# Patient Record
Sex: FEMALE | Race: WHITE
[De-identification: names, ages, dates, MRNs, and addresses within clinical notes are randomized per-mention and may not be internally consistent; named-entity substitution may affect disease eponyms.]

---

## 2019-01-23 LAB
BASOPHILS NFR BLD AUTO: 0.8 % (ref 0–2)
EOSINOPHIL NFR BLD: 3.3 % (ref 0–7)
ERYTHROCYTE [DISTWIDTH] IN BLOOD BY AUTOMATED COUNT: 18.7 % (ref 11.5–14.5)
HCT VFR BLD CALC: 28.3 % (ref 36–48)
HGB BLD-MCNC: 7.8 G/DL (ref 12–16)
IMM GRANULOCYTES NFR BLD: 0.2 % (ref 0–5)
LYMPHOCYTES NFR BLD AUTO: 31.7 % (ref 15–50)
MCH RBC QN AUTO: 15.5 PG (ref 26–34)
MCHC RBC AUTO-ENTMCNC: 27.6 G/DL (ref 31–37)
MCV RBC: 56.2 FL (ref 80–100)
MONOCYTES NFR BLD: 7.2 % (ref 2–11)
NEUTROPHILS NFR BLD AUTO: 56.8 % (ref 40–80)
PLATELET # BLD: 424 10X3/UL (ref 130–400)
RBC # BLD AUTO: 5.04 10X6/UL (ref 4–5.4)
WBC # BLD AUTO: 11.9 10X3/UL (ref 4.8–10.8)

## 2019-01-25 ENCOUNTER — HOSPITAL ENCOUNTER (OUTPATIENT)
Dept: HOSPITAL 84 - D.OPS | Age: 29
Discharge: HOME | End: 2019-01-25
Attending: OBSTETRICS & GYNECOLOGY
Payer: COMMERCIAL

## 2019-01-25 VITALS
HEIGHT: 64 IN | BODY MASS INDEX: 18.78 KG/M2 | HEIGHT: 64 IN | BODY MASS INDEX: 18.78 KG/M2 | BODY MASS INDEX: 18.78 KG/M2 | WEIGHT: 110 LBS | WEIGHT: 110 LBS

## 2019-01-25 VITALS — DIASTOLIC BLOOD PRESSURE: 61 MMHG | SYSTOLIC BLOOD PRESSURE: 115 MMHG

## 2019-01-25 DIAGNOSIS — N88.2: Primary | ICD-10-CM

## 2019-01-25 LAB — HCG UR QL: NEGATIVE

## 2019-01-25 NOTE — OP
PATIENT NAME:  LEANDRO GOODE                             MEDICAL RECORD: [id number]
:89                                             LOCATION:D.OPS          
                                                         ADMISSION DATE:        
SURGEON:  MAXIMILIAN BAXTER MD        
 
 
DATE OF OPERATION:  2019
 
PREOPERATIVE DIAGNOSES:
1.  Secondary amenorrhea.
2.  Suspected cervical stenosis.
 
PREOPERATIVE DIAGNOSES:
1.  Secondary amenorrhea.
2.  Suspected cervical stenosis.
 
PROCEDURE:  Hysteroscopy and D&C.
 
SURGEON:  Maximilian Baxter MD
 
ANESTHESIA:  General endotracheal.
 
INTRAVENOUS FLUIDS:  Per anesthesia record.
 
HYSTEROSCOPIC FLUID LOSS:  Approximately 100 cc of 0.9 normal saline.
 
COMPLICATIONS:  None apparent.
 
SPECIMENS:  Endometrial curettings.
 
FINDINGS:
1.  Grossly normal-appearing external genitalia, vagina, and cervix.
2.  No evidence of cervical stenosis.
3.  Grossly normal-appearing proliferative phase endometrial canal.
 
COMPLICATIONS:  None apparent.
 
DESCRIPTION OF PROCEDURE:  The patient was taken to the operating room, where
general anesthesia was achieved without any difficulty.  The patient was then
prepped and draped in normal sterile fashion in the dorsal lithotomy position in
the [facility].  After prep and drape, the bladder was drained of
approximately 100 cc of clear yellow urine.  A Graves speculum was then placed
in the vagina.  The cervix was then identified and grasped on the anterior lip
with a single-tooth tenaculum.  At this point, the patient was dilated to
approximately 5-6 mm and the hysteroscope was introduced into the endometrial
canal.  Survey of the endometrium was performed with identification of bilateral
ostia and the hysteroscope was then removed.  A gentle curettage was then
performed of all 4 quadrants and then the curette and tenaculum were removed. 
Only scant bleeding was noted from the cervical os following the procedure.  The
patient tolerated the procedure well and was transferred to postanesthesia
recovery stable without incident.
 
TRANSINT:[id number] Voice Confirmation ID: [id number] DOCUMENT ID: [id number]
                                           
 
 
 
OPERATIVE REPORT                               [id number]    LEANDRO GOODE           
 
 
                                           MAXIMILIAN BAXTER MD        
 
 
 
 
 
 
 
 
 
 
 
 
 
 
 
 
 
 
 
 
 
 
 
 
 
 
 
 
 
 
 
 
 
 
 
 
 
 
 
 
 
 
 
 
 
 
CC:                                                             [id number]
DICTATION DATE: 19     :     19 075      [facility] 
                                                                      [address]
[place] [address]